# Patient Record
(demographics unavailable — no encounter records)

---

## 2018-08-09 NOTE — EKG
[place] [address]
Phone:  [phone]                     ELECTROCARDIOGRAM REPORT      
_______________________________________________________________________________
 
Name:       PALLAVI CHEW                 Room:                      PRE [facility]#:  [id number]      Account #:      [id number]  
Admission:               Attend Phys:    KIERRA Hager
Discharge:               Date of Birth:  53  
         Report #: [id number]
    [id number]
_______________________________________________________________________________
THIS REPORT FOR:  //name//                      
 
                          [facility]
                                       
Test Date:    2018               Test Time:    09:16:28
Pat Name:     PALLAVI CHEW             Department:   
Patient ID:   [id number]            Room:          
Gender:       F                        Technician:   
:          1953               Requested By: Aleksey Em
Order Number: [id number]    Reading MD:   Ed Nichols
                                 Measurements
Intervals                              Axis          
Rate:         73                       P:            17
IA:           168                      QRS:          0
QRSD:         97                       T:            13
QT:           408                                    
QTc:          450                                    
                           Interpretive Statements
Sinus rhythm
Borderline T abnormalities, inferior leads
Compared to ECG 2007 10:00:45
Atrial premature complex(es) no longer present
Ventricular premature complex(es) no longer present
T-wave abnormality still present
 
Electronically Signed On 2018 15:18:27 CDT by dE Nichols
[email or web address]
 
 
 
 
 
 
 
 
 
 
 
 
 
 
 
 
  <ELECTRONICALLY SIGNED>
                                           By: Ed Nichols MD, FACC   
  18     1518
D: 18   _____________________________________
T: 18   Ed Nichols MD, [facility]     /EPI

## 2018-08-20 NOTE — NUR
PT ALERT AND ORIENTED X 4. PAIN BEING MANAGED BY IV AND PO PAIN MEDICATION.
DENIES NAUSEA.  ABLE TO VOID USING BEDPAN.  OXYGEN AT 3L/NC. IVF INFUSING
WITHOUT DIFFICULTY.
MEPILEX DRESSING DRY/INTACT. THIGH HIGH ANANDA HOSE IN PLACE. POLAR PACK IN
PLACE. VS STABLE. HOURLY ROUNDS MAINTAINED.  CALL LIGHT WITHIN PATIENT REACH.
NURSING WILL CONTINUE TO MONITOR.

## 2018-08-20 NOTE — NUR
PT ARRIVED TO FLOOR AT 1313. PT SLEEPING/DROWSY UPON ARRIVAL TO UNIT. MEPILEX
DRESSING ON RIGHT KNEE DRY AND INTACT. PT WEARING BILAT THIGH HIGH ANANDA HOSE.
POLAR CARE IN PLACE.  IV LEFT HAND PATENT. OXYGEN AT 3L/NC. VS STABLE. FAMILY
AT BEDSIDE AND CALL LIGHT WITHIN REACH.

## 2018-08-21 NOTE — NUR
SPOKE WITH PT. AND WILI MITCHELL. PT.LIVES AT HOME WITH HER . SHE
IS HIS PRIMARY CARE GIVER. HE WEIGHS OVER 400 LBS AND IS BASICALLY BED BOUND.
SHE SAID SHE THINKS THAT IS HOW SHE HURT HER KNEE. THIER SON ROMA IS STAYING
WITH HIM WHILE SHE IS HERE. HE WILL BE STAYING WITH THEM UNTIL PT.IS FULLY
RECOVERED. WILI WILL ALSO BE STAYING ABOUT 6 WEEKS WITH THEM. PT.HAS BEEN
USING A BARIATRIC WALKER OF HER HUSBANDS,SINCE HE DOES NOT USE IT ANY LONGER.
SHE SAID THEY HAVE ALOT OF DME AT HOME-, [employer] SCOOTER, BSC, SHOWER
BENCH. PT.PLANS TO STAY ON MAIN FLOOR. SHE HAS A RECLINER, SHE CAN SLEEP IN,
THAT IS REALLY COMFORTABLE. THEY HAVE A RAMP ENTRY TO THE HOUSE. SHE WOULD
LIKE TO USE [facility] FOR HH P.T. DISCUSSED CPM AND POLAR PACK. CM WILL FOLLOW.

## 2018-08-21 NOTE — NUR
PATIENT SLEPT WELL DURING THIS SHIFT.  PT WITH RESPIRATIONS OF 20 WHILE AWAKE
HOWEVER RESPIRATIONS CAN DROP TO 5-9 WHILE ASLEEP.  PT WOULD ASK FOR PAIN
MEDICATION WHEN WOKE FOR LABS, VITALS, RT CHECK, ETC BUT WOULD RETURN TO SLEEP
IMMEDIATELY.  PT CALLED FOR BEDPAN AT APPROX 0230 AND YELLING AT STAFF FOR NOT
GIVING HER PAIN MEDS WHILE SLEEPING THAT SHE WANTS IT AROUND THE CLOCK.
EXPLAINED TO PT ABOUT LOW RESPIRATIONS AND PRN PAIN MEDS WERE NOT GIVEN UNLESS
CALLED FOR.  PT GIVEN DILUADID 2MG IV.  PT REFUSING TO TURN HOWEVER ATTEMPTED
TO TURN TO RT SIDE BUT WAS UNABLE TO DO THIS.  PT REFUSING ASSISTANCE.  PT
BACK TO SLEEP SHORTLY AFTER NURSE LEFT THE ROOM.  VITALS WNL AND RESPIRATIONS
BETWEEN 12-20.  FREQUENTLY USED ITEMS AND CALL LIGHT WITHIN REACH.  SIDERAILS
UPX4 AND BED ALARM ON.  WILL CONTINUE TO MONITOR.

## 2018-08-21 NOTE — NUR
PT ALERT AND ORIENTED X 4. PAIN HAS BEEN MANAGED WITH PO AND IV MEDICATION.
IV PATENT AND SL. PT HAS ADEQUATE O2 SAT ON RA @ 96%   UP WITH ASSIST X 1 WITH
WALKER AND GAIT BELT. THERAPY IN MORNING AND AFTERNOON.  CPM IN USE 0-65
ONCE THIS SHIFT.  POLAR PACK IN PLACE. THIGH HIGH ANANDA HOSE IN PLACE. VS
STABLE. HOURLY ROUNDS MAINTAINED. CALL LIGHT WITHIN REACH. NURSING WILL
CONTINUE TO MONITOR.

## 2018-08-22 NOTE — NUR
PATIENT RESTING IN BED. PATIENT IS UP MINIMAL ASSIST WITH GAIT BELT AND
WALKER. PATIENT HAS BEEN UP BY SELF, PATIENT EDUCATED ON NOT GETTING UP
WITHOUT ASSIST BUT HAS CONTINUED TO GET UP INDEPENDENTLY. BED AND CHAIR ALARMS
ARE ON. PATIENT WORKED WITH THERAPY TODAY. PATIENT REFUSED CPM THIS AFTERNOON.
PAIN CONTROLLED WITH OXYCODONE. PATIENT HAS POLAR CARE TO RIGHT KNEE. PATIENT
DENIES ANY NEEDS AT THIS TIME. CALL LIGHT WITHIN REACH. WILL CONTINUE TO
MONITOR.

## 2018-08-22 NOTE — NUR
PATIENT SLEPT WELL DURING THIS SHIFT.  PT REFUSED CPM MACHINE LAST NIGHT C/O
KNEE WAS IN TOO MUCH PAIN.  EXPLAINED TO PT THAT KEEPING KNEE MOVING WOULD
NOT ONLY PREVENT BLOOD CLOTS BUT AID IN RANGE OF MOTION AND TO EASE PAIN.  PT
UP TO BSC TO VOID, REFUSING TO AMBULATE TO BATHROOM.  PT GIVEN OXYCODONE 10MG
THROUGH THE NIGHT FOR PAIN.  VITALS AND SATS WNL.  PT IS ON ROOM AIR.  IV IN
LT HAND PATENT.  FREQUENTLY USED ITEMS AND CALL LIGHT WITHIN REACH.  SIDERAILS
UPX3 AND BED ALARM ON.  WILL CONTINUE TO MONITOR.

## 2018-08-23 NOTE — NUR
PT SLEPT OFF AND ON OVERNIGHT. USING CPM AT HS FOR ONLY AN HOUR THEN
REQUESTING IT TO BE REMOVED. CO CONTINUED R KNEE PAIN. LHAND SL IV. RECEIVING
2 OXY IR SEVERAL TIMES OVERNIGHT FOR CO PAIN AND THEN BACK TO SLEEP. AM LABS
DRAWN. REFUSING TO TURN WITH STAFF OVERNIGHT, ABLE TO MOVE ABOUT IN BED INDEP
BUT NOT TURNING SIDE TO SIDE, EDUCATION GIVEN. VSS. ANANDA HOSE RLE. DRSG CDI TO
R KNEE. ANTICIPATING DISCHARGE HOME TODAY. NO BM YET SHE STATES BUT "GAS
RUMBLING". ABLE TO USE CALL LITE AND MAKE NEEDS KNOWN. BED ALARM ON OVERNIGHT
FOR SAFETY.

## 2018-08-23 NOTE — NUR
CM CALLED IN ELIQUIS PRESCRIPTION ,AS WRITTEN, TO PT.S PHARMACY- [facility] IN [place]. WILL CALL BACK IN ONE HR.FOR COPAY.

## 2018-08-23 NOTE — NUR
PATIENT DISCHARGED TO HOME WITH HOME HEALTH. DISCHAREG PAPERS REVIEWED AND
SIGNED. PRESCRIPTIONS AND INFORMATION SHEETS GIVEN. IV REMOVED. BELONGINGS
PACKED. PATIENT SENT WITH CPM AND WALKER. PATIENT AND GRANDDAUGHTER DENY ANY
FURTHER NEEDS. PATIENT TAKEN BY WHEELCHAIR TO EXIT. LEFT WITH GRANDDAUGHTER.

## 2018-08-27 NOTE — OP
[address]  [address]                    OPERATIVE REPORT              
_______________________________________________________________________________
 
Name:       PALLAVI CHEW                 Room:           M.114-P    DIS IN  
M.R.#:  [id number]      Account #:      [id number]  
Admission:  08/20/18     Attend Phys:    KIERRA Hager
Discharge:  08/23/18     Date of Birth:  03/17/53  
         Report #: [id number]
                                                                     [id number]  
_______________________________________________________________________________
THIS REPORT FOR:  //name//                      
 
CC: Milton Saenz
 
DATE OF SERVICE:  08/20/2018
 
 
PREOPERATIVE DIAGNOSIS:  Right knee degenerative joint disease.
 
POSTOPERATIVE DIAGNOSIS:  Right knee degenerative joint disease.
 
PROCEDURE:  Right total knee arthroplasty.
 
SURGEON:  Aleksey Em DO.
 
FIRST ASSISTANT:  Shaun Gross DO.
 
ESTIMATED BLOOD LOSS:  100 mL.
 
COMPLICATIONS:  None.
 
DRAINS:  None.
 
SPECIMEN REMOVED:  None.
 
ANTIBIOTICS:  Ancef 2 grams IV preoperatively.
 
TOURNIQUET TIME:  Approximately 49 minutes.
 
ORTHOPEDIC IMPLANTS:  Goldie total knee arthroplasty system:
1.  Size 7 narrow cruciate-retaining femur.
2.  Size E tibial baseplate.
3.  A 12-mm polyethylene spacer, medial congruent.
4.  A 32-mm patella.
5.  Two bags of Palacos bone cement with gentamicin.
 
CONDITION OF THE PATIENT:  Stable to PACU.
 
ANESTHESIA:  General.
 
INDICATIONS:  This is a pleasant 65-year-old female seen in my clinic regarding
right knee pain she has had for quite some time.  She unfortunately failed
conservative treatment including anti-inflammatory medications and steroid
injections.  She had degenerative joint disease seen on x-rays.  Due to failed
 
 
 
[facility] 
[address] [place]  [address]                    OPERATIVE REPORT              
_______________________________________________________________________________
 
Name:       PALLAVI CHEW RONDA                 Room:           [address] [place]  
[facility]#:  [id number]      Account #:      [id number]  
Admission:  08/20/18     Attend Phys:    KIERRA Hager
Discharge:  08/23/18     Date of Birth:  03/17/53  
         Report #: [id number]
                                                                     [id number]  
_______________________________________________________________________________
conservative treatment, I discussed right total knee arthroplasty.  I discussed
procedure, risks, benefits, complications and indications in detail with her. 
Risks discussed include but not limited to infection, neurovascular injury,
continued or worsening pain, hardware failure, fracture, need for further
surgery, arthrofibrosis, DVT, PE and anesthesia complications.  She did express
understanding and wished to proceed with surgery.
 
DESCRIPTION OF PROCEDURE:  After consent was obtained, the patient was taken to
the operative suite and placed supine position on the operating room table.  She
was given benefit of general anesthesia.  A well-padded tourniquet was placed on
the right upper thigh.  Right leg was sterilely prepped and draped in the usual
fashion.  Preop timeout was obtained to confirm the correct patient, procedure
and operative site.
 
Surgery began with standard anterior knee midline incision.  Sharp dissection
was taken down to the level of the capsule.  Please note the tourniquet was
inflated to 300 mmHg prior to incision.  A new knife was used and a medial
parapatellar arthrotomy was performed.  She had normal-appearing joint effusion.
 Patella was everted.  She had an eburnated bone throughout all 3 compartments
with osteophytes throughout.  At this time, a femoral drill was used to open the
femoral canal.  T-handle and intramedullary antonio were placed, measuring 5-degree
valgus cut.  Hohmann retractors were placed all times to protect collateral
ligaments.  Distal femoral cut was made to the captured block.
 
Attention was then turned to the tibia.  External tibial guide was utilized.  A
10 mm cut was measured off the high lateral side and the proximal tibial cut was
made to the captured block.  The knee was taken through extension, 10 block was
used to ensure adequate resection.  The knee was again flexed.  AP sizer was
placed, measuring a size 7.  The 4-in-1 cutting block was then pinned in place
and 2  holes were drilled in approximately 3 degrees of external rotation. 
The distal femur measured size 7.  Two  holes were drilled in 3 degrees
external rotation.  The size 4-in-1 cutting block was then pinned in place and
the anterior and posterior condylar cuts as well as chamfer cuts were then made.
 Posterior osteophytes were removed with curved osteotome and rongeur.
 
Attention was then turned to the tibia.  External tibial guide was utilized. 
This measured size E.  The size E tibial trial was pinned in place in
appropriate rotation utilizing drop antonio.  Trial femur was placed and a size 10
spacer was placed.  Knee was taken through range of motion.  She had full
flexion and extension with good balancing.  The patella was everted.  Posterior
patellar cut was made using freehand technique, this measured size 32.  Three
peg holes were drilled, size 32 button was placed.  The knee was taken through
range of motion.  The patella did track well.  At this time, trial femur and
spacer were removed.  The proximal tibia was opened with a drill and punch.  All
trial components were removed.  The knee was thoroughly irrigated with pulsatile
lavage and dried with a clean lap sponge.
 
 
 
[address]  [address]                    OPERATIVE REPORT              
_______________________________________________________________________________
 
Name:       PALLAVI CHEW                 Room:           [address] [place]  
[facility]#:  [id number]      Account #:      [id number]  
Admission:  08/20/18     Attend Phys:    KIERRA Hager
Discharge:  08/23/18     Date of Birth:  03/17/53  
         Report #: [id number]
                                                                     [id number]  
_______________________________________________________________________________
 
Final components were opened, cement was mixed, placed on the exposed bone and
final components.  These were then packed into place.  Excess cement was
removed.  A size 12 spacer was placed.  Knee was taken through extension.  Axial
compression was applied until the cement hardened.  Once the cement hardened,
trial reduction was performed and a final size 12 spacer was chosen.  This was
placed on a clean tibial tray locked in place.  Audible click was heard.  Knee
was taken through final range of motion.  She had full flexion/extension with
stable varus and valgus testing and equal flexion and extension gaps and good
patellar tracking.  Tourniquet was let to deflate at 49 minutes.  Hemostasis
achieved with electrocautery and direct pressure.  The knee was again thoroughly
irrigated.  Ortho cocktail was injected posteriorly.  The capsular tissue was
closed with #1 Vicryl in a figure-of-eight fashion.  PRP was injected into the
capsule.  Knee was again thoroughly irrigated.  Subcutaneous tissue closed with
2-0 Vicryl in interrupted fashion, followed by running Stratafix suture on the
skin.  This covered with Dermabond, was allowed to dry, Mepilex silver dressing,
4 x 4s, and Ace bandage.  She did tolerate the procedure well without
complication.  She was taken to the recovery room in stable condition.  All
needle and sponge counts correct x 2 at the end of the procedure.
 
She will be admitted postoperatively for pain and control, physical therapy.  I
expect at least 2 midnight stay.  She has to take care of her  at home
and does not want to go to a facility.  I expect she will be discharged roughly
Thursday.  Appropriate postoperative analgesia and DVT prophylaxis in the form
of Eliquis 2.5 mg p.o. b.i.d. as well as CPM machine.
 
 
 
 
 
 
 
 
 
 
 
 
 
 
 
 
 
 
 
<ELECTRONICALLY SIGNED>
                                        By:  Aleksey Em DO            
08/27/18     1215
D: 08/20/18 1201_______________________________________
T: 08/20/18 1315Dazachary Em DO               /nt